# Patient Record
Sex: MALE | URBAN - METROPOLITAN AREA
[De-identification: names, ages, dates, MRNs, and addresses within clinical notes are randomized per-mention and may not be internally consistent; named-entity substitution may affect disease eponyms.]

---

## 2022-12-01 ENCOUNTER — OFFICE VISIT (OUTPATIENT)
Dept: URBAN - METROPOLITAN AREA CLINIC 84 | Facility: CLINIC | Age: 42
End: 2022-12-01
Payer: COMMERCIAL

## 2022-12-01 ENCOUNTER — DASHBOARD ENCOUNTERS (OUTPATIENT)
Age: 42
End: 2022-12-01

## 2022-12-01 VITALS
WEIGHT: 296.2 LBS | BODY MASS INDEX: 38.01 KG/M2 | HEIGHT: 74 IN | HEART RATE: 74 BPM | SYSTOLIC BLOOD PRESSURE: 145 MMHG | DIASTOLIC BLOOD PRESSURE: 90 MMHG | TEMPERATURE: 97.1 F

## 2022-12-01 DIAGNOSIS — R10.13 EPIGASTRIC ABDOMINAL PAIN: ICD-10-CM

## 2022-12-01 PROCEDURE — 99204 OFFICE O/P NEW MOD 45 MIN: CPT | Performed by: INTERNAL MEDICINE

## 2022-12-01 RX ORDER — CYCLOBENZAPRINE HYDROCHLORIDE 10 MG/1
TABLET, FILM COATED ORAL
Qty: 30 TABLET | Status: DISCONTINUED | COMMUNITY

## 2022-12-01 RX ORDER — OMEPRAZOLE 20 MG/1
TAKE ONE CAPSULE BY MOUTH ONE TIME DAILY 30 MINUTES BEFORE MORNING MEAL FOR 90 DAYS CAPSULE, DELAYED RELEASE ORAL
Qty: 90 UNSPECIFIED | Refills: 0 | Status: DISCONTINUED | COMMUNITY

## 2022-12-01 RX ORDER — IBUPROFEN 600 MG/1
TAKE ONE TABLET BY MOUTH EVERY 6 HOURS AS NEEDED FOR PAIN TABLET, FILM COATED ORAL
Qty: 30 UNSPECIFIED | Refills: 0 | Status: DISCONTINUED | COMMUNITY

## 2022-12-01 RX ORDER — PREDNISONE 10 MG/1
TAKE TWO TABLETS BY MOUTH EVERY MORNING FOR 7 DAYS TABLET ORAL
Qty: 14 UNSPECIFIED | Refills: 0 | Status: DISCONTINUED | COMMUNITY

## 2022-12-01 RX ORDER — ACETAMINOPHEN AND CODEINE PHOSPHATE 300; 30 MG/1; MG/1
TAKE ONE TABLET BY MOUTH EVERY 6 HOURS IF NEEDED FOR SEVERE PAIN FOR UP TO 5 DAYS TABLET ORAL
Qty: 20 UNSPECIFIED | Refills: 0 | Status: DISCONTINUED | COMMUNITY

## 2022-12-01 NOTE — PHYSICAL EXAM NECK/THYROID:
normal appearance , without tenderness upon palpation , no deformities , trachea midline , Thyroid normal size , no thyroid nodules , no masses , no JVD , thyroid nontender Abdomen soft, non-tender, no guarding.

## 2022-12-01 NOTE — HPI-TODAY'S VISIT:
This patient was referred by Dr. Alison Hodges for an evaluation of abdominal pain.  A copy of this will be sent to the referring provider.  He ahs been having intermittent right sided abdominal pain.  He's had imaging and HIDA Scan showed decreased GB EF so he had a CCY.  He still has the pain on the right side.  He also has back and shoulder pain.  The pain was associated with alcohol intake.  The pain has decreased since he has been avoiding fried foods.  The pain is an ache.  It camn last for many days.  There is claudio ssociated N/V.  He denies GERD/dysphagia.  He denies early satiety. He denies anorexia or weight loss.  He denies LGI symptoms.  He denies LGI Bleed or melena.  The pain is not related to his BM's.  He has not had CT Scan or EGD

## 2022-12-07 ENCOUNTER — OFFICE VISIT (OUTPATIENT)
Dept: URBAN - METROPOLITAN AREA SURGERY CENTER 20 | Facility: SURGERY CENTER | Age: 42
End: 2022-12-07

## 2023-05-24 ENCOUNTER — OFFICE VISIT (OUTPATIENT)
Dept: URBAN - METROPOLITAN AREA CLINIC 27 | Facility: CLINIC | Age: 43
End: 2023-05-24